# Patient Record
Sex: FEMALE | URBAN - METROPOLITAN AREA
[De-identification: names, ages, dates, MRNs, and addresses within clinical notes are randomized per-mention and may not be internally consistent; named-entity substitution may affect disease eponyms.]

---

## 2023-03-06 ENCOUNTER — LAB OUTSIDE AN ENCOUNTER (OUTPATIENT)
Dept: URBAN - METROPOLITAN AREA CLINIC 35 | Facility: CLINIC | Age: 43
End: 2023-03-06

## 2023-03-06 ENCOUNTER — OFFICE VISIT (OUTPATIENT)
Dept: URBAN - METROPOLITAN AREA CLINIC 35 | Facility: CLINIC | Age: 43
End: 2023-03-06
Payer: SELF-PAY

## 2023-03-06 VITALS
HEIGHT: 66 IN | BODY MASS INDEX: 29.41 KG/M2 | SYSTOLIC BLOOD PRESSURE: 116 MMHG | OXYGEN SATURATION: 99 % | HEART RATE: 64 BPM | DIASTOLIC BLOOD PRESSURE: 78 MMHG | WEIGHT: 183 LBS

## 2023-03-06 DIAGNOSIS — R14.0 ABDOMINAL BLOATING: ICD-10-CM

## 2023-03-06 DIAGNOSIS — R11.10 REGURGITATION AND RECHEWING OF FOOD: ICD-10-CM

## 2023-03-06 DIAGNOSIS — A04.8 HELICOBACTER PYLORI (H. PYLORI) INFECTION: ICD-10-CM

## 2023-03-06 DIAGNOSIS — K59.01 CONSTIPATION BY DELAYED COLONIC TRANSIT: ICD-10-CM

## 2023-03-06 PROBLEM — 35298007: Status: ACTIVE | Noted: 2023-03-06

## 2023-03-06 PROCEDURE — 99204 OFFICE O/P NEW MOD 45 MIN: CPT | Performed by: INTERNAL MEDICINE

## 2023-03-06 RX ORDER — DICYCLOMINE HYDROCHLORIDE 20 MG/1
1 TABLET TABLET ORAL THREE TIMES A DAY
Status: ACTIVE | COMMUNITY

## 2023-03-06 RX ORDER — OMEPRAZOLE 20 MG/1
1 CAPSULE 30 MINUTES BEFORE MORNING MEAL CAPSULE, DELAYED RELEASE ORAL ONCE A DAY
Qty: 30 | Refills: 3 | OUTPATIENT
Start: 2023-03-06

## 2023-03-06 RX ORDER — PHENTERMINE HYDROCHLORIDE 37.5 MG/1
1 TABLET TABLET ORAL ONCE A DAY
Status: ACTIVE | COMMUNITY

## 2023-03-06 NOTE — HPI-GERD
42 Year old female patient admits recent onset of heartburn/reflux for the past 2 weeks.  She notes reflux episodes will occur with certain foods such as dairy, with each episode lasting 1-2 hours. She denies currently taking medication for it.  When symptoms occur she will drink water with relief of symptoms.   She admits early satiety, but is on Phenteremine.  Patient admits previous EGD 3 years ago in [facility], she reports that she was H. Pylori positive 3 years ago also in [facility]. She was treated, and retested negative.

## 2023-03-06 NOTE — HPI-BLOATING
42 year old female patient complains of bloating. Patient admits symptoms are more present after meals. Patient admits symptoms have been present since 12/2022.  Patient admits taking Dicyclomine with some relief of symptoms. Patient also avoids dairy now.  She admits her bloating can make her feel uncomfortable. She admits gas is passable via flatulence and occasional belching.  EGD 3 years ago, and was told she has gastritis. Patient denies previous breath test including H. Pylori or SIBO.

## 2023-03-06 NOTE — HPI-CONSTIPATION
Patient admits change in bowel habits. Patient admits longstanding history of constipation. Patient currently admits 2 bowel movements per week, with occasional straining.  Stools are formed and hard. Denies any blood, mucus, or melena in stools. She denies any pruritus ani or rectal pain. She reports the use of Miralax 2-3 times a week to help with bowel movements.  No recent labwork, never had a colonoscopy.

## 2023-03-15 LAB
ABSOLUTE BASOPHILS: 30
ABSOLUTE EOSINOPHILS: 133
ABSOLUTE LYMPHOCYTES: 2250
ABSOLUTE MONOCYTES: 348
ABSOLUTE NEUTROPHILS: 4640
ALBUMIN/GLOBULIN RATIO: 1.7
ALBUMIN: 4
ALKALINE PHOSPHATASE: 81
ALT: 11
AST: 14
BASOPHILS: 0.4
BILIRUBIN, TOTAL: 0.4
BUN/CREATININE RATIO: (no result)
CALCIUM: 8.8
CARBON DIOXIDE: 26
CHLORIDE: 105
CREATININE: 0.55
EOSINOPHILS: 1.8
GLOBULIN: 2.4
GLUCOSE: 78
H PYLORI BREATH TEST: NOT DETECTED
H. PYLORI BREATH TEST: NOT DETECTED
HEMATOCRIT: 37
HEMOGLOBIN: 12.7
INTERPRETATION: NOT DETECTED
LYMPHOCYTES: 30.4
MCH: 30.2
MCHC: 34.3
MCV: 87.9
MONOCYTES: 4.7
MPV: 9.1
NEUTROPHILS: 62.7
PLATELET COUNT: 347
POTASSIUM: 4.3
PROTEIN, TOTAL: 6.4
RDW: 12.4
RED BLOOD CELL COUNT: 4.21
SODIUM: 139
TSH: 0.7
UREA NITROGEN (BUN): 13
WHITE BLOOD CELL COUNT: 7.4

## 2023-03-24 ENCOUNTER — TELEPHONE ENCOUNTER (OUTPATIENT)
Dept: URBAN - METROPOLITAN AREA CLINIC 35 | Facility: CLINIC | Age: 43
End: 2023-03-24

## 2023-04-05 ENCOUNTER — TELEPHONE ENCOUNTER (OUTPATIENT)
Dept: URBAN - METROPOLITAN AREA CLINIC 35 | Facility: CLINIC | Age: 43
End: 2023-04-05

## 2023-04-17 ENCOUNTER — LAB OUTSIDE AN ENCOUNTER (OUTPATIENT)
Dept: URBAN - METROPOLITAN AREA CLINIC 35 | Facility: CLINIC | Age: 43
End: 2023-04-17

## 2023-04-17 ENCOUNTER — DASHBOARD ENCOUNTERS (OUTPATIENT)
Age: 43
End: 2023-04-17

## 2023-04-17 ENCOUNTER — OFFICE VISIT (OUTPATIENT)
Dept: URBAN - METROPOLITAN AREA CLINIC 35 | Facility: CLINIC | Age: 43
End: 2023-04-17
Payer: SELF-PAY

## 2023-04-17 VITALS
OXYGEN SATURATION: 98 % | BODY MASS INDEX: 29.73 KG/M2 | HEART RATE: 62 BPM | SYSTOLIC BLOOD PRESSURE: 110 MMHG | WEIGHT: 185 LBS | HEIGHT: 66 IN | DIASTOLIC BLOOD PRESSURE: 62 MMHG

## 2023-04-17 DIAGNOSIS — K59.01 CONSTIPATION BY DELAYED COLONIC TRANSIT: ICD-10-CM

## 2023-04-17 DIAGNOSIS — R14.0 ABDOMINAL BLOATING: ICD-10-CM

## 2023-04-17 DIAGNOSIS — A04.8 HELICOBACTER PYLORI (H. PYLORI) INFECTION: ICD-10-CM

## 2023-04-17 DIAGNOSIS — R11.10 REGURGITATION AND RECHEWING OF FOOD: ICD-10-CM

## 2023-04-17 PROCEDURE — 99214 OFFICE O/P EST MOD 30 MIN: CPT | Performed by: PHYSICIAN ASSISTANT

## 2023-04-17 RX ORDER — OMEPRAZOLE 20 MG/1
1 CAPSULE 30 MINUTES BEFORE MORNING MEAL CAPSULE, DELAYED RELEASE ORAL ONCE A DAY
Qty: 30 | Refills: 3 | Status: ACTIVE | COMMUNITY
Start: 2023-03-06

## 2023-04-17 RX ORDER — DICYCLOMINE HYDROCHLORIDE 20 MG/1
1 TABLET TABLET ORAL THREE TIMES A DAY
Status: ON HOLD | COMMUNITY

## 2023-04-17 RX ORDER — PHENTERMINE HYDROCHLORIDE 37.5 MG/1
1 TABLET TABLET ORAL ONCE A DAY
Status: ON HOLD | COMMUNITY

## 2023-04-17 RX ORDER — OMEPRAZOLE 20 MG/1
1 CAPSULE 30 MINUTES BEFORE MORNING MEAL CAPSULE, DELAYED RELEASE ORAL ONCE A DAY
Qty: 30 | Refills: 3 | OUTPATIENT

## 2023-04-17 NOTE — HPI-BLOATING
Patient admits the continued symptoms of Bloating/gas without the use of Miralax.     Last visit 03/06/2023 42 year old female patient complains of bloating. Patient admits symptoms are more present after meals. Patient admits symptoms have been present since 12/2022.  Patient admits taking Dicyclomine with some relief of symptoms. Patient also avoids dairy now.  She admits her bloating can make her feel uncomfortable. She admits gas is passable via flatulence and occasional belching.  EGD 3 years ago, and was told she has gastritis. Patient denies previous breath test including H. Pylori or SIBO.

## 2023-04-17 NOTE — HPI-GERD
42 Year old female patient presents today for a follow up of GERD/reflux. She admits any breakthrough episodes since her last office visit. She admits the continued use of Omeprazole 20 mg with/out control of symptoms.   Last visit 03/06/2023 42 Year old female patient admits recent onset of heartburn/reflux for the past 2 weeks.  She notes reflux episodes will occur with certain foods such as dairy, with each episode lasting 1-2 hours. She denies currently taking medication for it.  When symptoms occur she will drink water with relief of symptoms.   She admits early satiety, but is on Phenteremine.  Patient admits previous EGD 3 years ago in [facility], she reports that she was H. Pylori positive 3 years ago also in [facility]. She was treated, and retested negative.

## 2023-04-17 NOTE — HPI-CONSTIPATION
Patient admits the continued episode of constipation. She admits the use of Miralax with improvement of symptoms. Patient currently admits one to two  bowel movements per week, without strain with the use of Miralax, stools are formed and normal. She denies  mucus or melena in stools. She denies any rectal pain or pruritus ani. She is passing gas, no abd pain, N/V.  Patient completed X-Ray on 03/08/2023 with findings of: - There are air fluid levels in multiple small bowel loops in the mid abdomen as well as in the right colon consistent with nonobstructive distension or ileus. - No definite evidence of bowel obstruction is seen    Last visit 03/06/2023 Patient admits change in bowel habits. Patient admits longstanding history of constipation. Patient currently admits 2 bowel movements per week, with occasional straining.  Stools are formed and hard. Denies any blood, mucus, or melena in stools. She denies any pruritus ani or rectal pain. She reports the use of Miralax 2-3 times a week to help with bowel movements.  No recent labwork, never had a colonoscopy.

## 2025-04-22 ENCOUNTER — LAB OUTSIDE AN ENCOUNTER (OUTPATIENT)
Dept: URBAN - METROPOLITAN AREA CLINIC 35 | Facility: CLINIC | Age: 45
End: 2025-04-22

## 2025-04-22 ENCOUNTER — OFFICE VISIT (OUTPATIENT)
Dept: URBAN - METROPOLITAN AREA CLINIC 35 | Facility: CLINIC | Age: 45
End: 2025-04-22

## 2025-04-22 PROBLEM — 82934008: Status: ACTIVE | Noted: 2025-04-22

## 2025-04-22 RX ORDER — DICYCLOMINE HYDROCHLORIDE 20 MG/1
1 TABLET TABLET ORAL THREE TIMES A DAY
Status: ON HOLD | COMMUNITY

## 2025-04-22 RX ORDER — OMEPRAZOLE 20 MG/1
1 CAPSULE 30 MINUTES BEFORE MORNING MEAL CAPSULE, DELAYED RELEASE ORAL ONCE A DAY
Qty: 30 | Refills: 3 | Status: ON HOLD | COMMUNITY

## 2025-04-22 RX ORDER — PHENTERMINE HYDROCHLORIDE 37.5 MG/1
1 TABLET TABLET ORAL ONCE A DAY
Status: ON HOLD | COMMUNITY

## 2025-04-22 NOTE — HPI-TODAY'S VISIT:
A 44 year old female patient with previous history of Bloating, Regurgitation, Constipation, H. Pylori infection presents today due to abdominal discomfort. Patient mentions long history of postprandial bloating, epigastric pain. Denies nausea/emesis, heartburn, regurgitation, fever, diarrhea. Patient has been taking Milk of magnesia with partial improvement of symptoms. Patient also mentions long history of constipation. Patient mentions 2 bowel movements per week with solid consistency without blood melena or pain.  Patient was on unspecified weight loss injections and also mounjaro for weight loss treatment. Patient weight was 185lb and after discontinuing mounjaro patient gained 15 lbs.    Patient denies a family history of esophageal, gastric, colonic or rectal cancer. Denies a past EGD/COL.   Bloating is her main complain, and increased flatus. Certain foods make it worse like bread, and lactose. Patient just started MiraLax. Last week had pain associated with the bloating . It consides with a diet pill that she started. Since she stopped. Last Appointment - 4/2023 - Joanna  Omeprazole DR 20 mg QD   Miralax QD   Dicyclomine PRN  Negative H. Pylori: 3/2023  ABD CT - Did not do it

## 2025-05-12 ENCOUNTER — CLAIMS CREATED FROM THE CLAIM WINDOW (OUTPATIENT)
Dept: URBAN - METROPOLITAN AREA CLINIC 4 | Facility: CLINIC | Age: 45
End: 2025-05-12
Payer: SELF-PAY

## 2025-05-12 ENCOUNTER — CLAIMS CREATED FROM THE CLAIM WINDOW (OUTPATIENT)
Dept: URBAN - METROPOLITAN AREA SURGERY CENTER 8 | Facility: SURGERY CENTER | Age: 45
End: 2025-05-12
Payer: SELF-PAY

## 2025-05-12 DIAGNOSIS — Z12.11 COLON CANCER SCREENING: ICD-10-CM

## 2025-05-12 DIAGNOSIS — K63.5 POLYP OF COLON: ICD-10-CM

## 2025-05-12 DIAGNOSIS — K63.89 OTHER SPECIFIED DISEASES OF INTESTINE: ICD-10-CM

## 2025-05-12 PROCEDURE — 00812 ANES LWR INTST SCR COLSC: CPT | Performed by: NURSE ANESTHETIST, CERTIFIED REGISTERED

## 2025-05-12 PROCEDURE — 45385 COLONOSCOPY W/LESION REMOVAL: CPT | Performed by: INTERNAL MEDICINE

## 2025-05-12 PROCEDURE — 88305 TISSUE EXAM BY PATHOLOGIST: CPT | Performed by: PATHOLOGY

## 2025-05-12 RX ORDER — DICYCLOMINE HYDROCHLORIDE 20 MG/1
1 TABLET TABLET ORAL THREE TIMES A DAY
Status: ON HOLD | COMMUNITY

## 2025-05-12 RX ORDER — PHENTERMINE HYDROCHLORIDE 37.5 MG/1
1 TABLET TABLET ORAL ONCE A DAY
Status: ON HOLD | COMMUNITY

## 2025-05-12 RX ORDER — OMEPRAZOLE 20 MG/1
1 CAPSULE 30 MINUTES BEFORE MORNING MEAL CAPSULE, DELAYED RELEASE ORAL ONCE A DAY
Qty: 30 | Refills: 3 | Status: ON HOLD | COMMUNITY

## 2025-05-27 ENCOUNTER — OFFICE VISIT (OUTPATIENT)
Dept: URBAN - METROPOLITAN AREA CLINIC 35 | Facility: CLINIC | Age: 45
End: 2025-05-27

## 2025-05-29 ENCOUNTER — TELEPHONE ENCOUNTER (OUTPATIENT)
Dept: URBAN - METROPOLITAN AREA CLINIC 35 | Facility: CLINIC | Age: 45
End: 2025-05-29

## 2025-07-30 ENCOUNTER — OFFICE VISIT (OUTPATIENT)
Dept: URBAN - METROPOLITAN AREA CLINIC 35 | Facility: CLINIC | Age: 45
End: 2025-07-30

## 2025-07-30 RX ORDER — OMEPRAZOLE 20 MG/1
1 CAPSULE 30 MINUTES BEFORE MORNING MEAL CAPSULE, DELAYED RELEASE ORAL ONCE A DAY
Qty: 30 | Refills: 3 | Status: ON HOLD | COMMUNITY

## 2025-07-30 RX ORDER — DICYCLOMINE HYDROCHLORIDE 20 MG/1
1 TABLET TABLET ORAL THREE TIMES A DAY
Status: ON HOLD | COMMUNITY

## 2025-07-30 RX ORDER — PHENTERMINE HYDROCHLORIDE 37.5 MG/1
1 TABLET TABLET ORAL ONCE A DAY
Status: ON HOLD | COMMUNITY

## 2025-07-30 NOTE — HPI-TODAY'S VISIT:
A 45 year old female patient with previous history of Chronic idiopathic constipation, bloating presents today for her follow up COL. Since having the procedure, patient is having 1 bowel movement per every 2-3 week.  Stools are normal and formed, without the presence of blood, mucus, or melena. Patient denies pruritus ani or rectal pain. Patient denies any complications following the procedure. Patient takes daily MiraLax 1 cap only 2-3 times a week, and ExLax to have a BM. Also probiotic. Patient has some bloating and gas   Last appointment  High fiber diet, at least 2 L fluids QD  Miralax QD    COL -  5/12/2025 - Impression  The quality of the bowel preparation was excellent.  The examined portion of the ileum was normal.  One 4 mm polyp in the proximal descending colon, removed with a cold snare. Resected and retrieved.  Diverticulosis in the descending colon.  Diverticulosis in the distal ascending colon.  Internal hemorrhoids.  The examination was otherwise normal on direct and retroflexion views    Pathology  Colon, Descending; Proximal, Polypectomy (Cold Snare):  BENIGN MUCOSAL POLYP(S). See Comment.   Negative for Dysplasia or Malignancy.   COMMENT: We use "benign mucosal polyp" to refer to an endoscopically polypoid lesion that shows no dysplastic or hyperplastic epithelium or classic features of hamartomatous polyp